# Patient Record
Sex: MALE | Race: WHITE | NOT HISPANIC OR LATINO | Employment: FULL TIME | URBAN - METROPOLITAN AREA
[De-identification: names, ages, dates, MRNs, and addresses within clinical notes are randomized per-mention and may not be internally consistent; named-entity substitution may affect disease eponyms.]

---

## 2022-07-20 ENCOUNTER — TELEPHONE (OUTPATIENT)
Dept: ORTHOPEDICS | Facility: CLINIC | Age: 42
End: 2022-07-20

## 2022-07-20 NOTE — TELEPHONE ENCOUNTER
ANDRZEJ. Patient is coming in for a 2nd opinion and is post-surgical ACL of the left knee. LVM stating that Dr. Allen is happy to see him for this appointment but wanted to let him know that if he is looking for a surgical opinion, Dr. Allen is not a surgeon. Melly Prater, ATC

## 2022-07-20 NOTE — PROGRESS NOTES
"Jose Ford  :  1980  DOS: 2022  MRN: [id number]    Sports Medicine Clinic Visit    PCP: No primary care provider on file.    Jose Ford is a 42 year old male who is seen as a self referral presenting with left knee pain.    Injury: Surgery at age 18. Notes 3 weeks ago, anteroinferior knee and medial knee pain. Popping incident while scratching the back of his left knee with his right toe. Able to put partial weight on his knee with the assistance of crutches.  Additional Features:  Positive: popping, grinding, swelling and fells like it is popping \"in and out of socket\".  Symptoms are better with Nothing.  Symptoms are worse with: ambulating and weightbearing.  Other evaluation and/or treatments so far consists of: aleve, SalonPas, water pill, IcyHot, bracing and crutches.  Recent imaging completed: X-rays completed 2022.  Prior History of related problems: Flare-ups since surgery at age 18 but nothing this prolonged or painful.    Social History: Works as a cook/    Review of Systems  Musculoskeletal: as above  Remainder of review of systems is negative including constitutional, CV, pulmonary, GI, Skin and Neurologic except as noted in HPI or medical history.    No past medical history on file.  No past surgical history on file.  No family history on file.    Objective  BP (!) 190/118   Wt 76.2 kg (168 lb)       General: healthy, alert and in no distress      HEENT: no scleral icterus or conjunctival erythema     Skin: no suspicious lesions or rash. No jaundice.     CV: regular rhythm by palpation, 2+ distal pulses, no pedal edema      Resp: normal respiratory effort without conversational dyspnea     Psych: normal mood and affect      Gait: antalgic, appropriate coordination and balance, using crutches     Neuro: normal light touch sensory exam of the extremities. Motor strength as noted below     Left Knee exam    ROM:        Flexion 90 degrees       Extension -6 degrees    "    Range of motion limited by pain, effusion    Inspection:       no visible ecchymosis        effusion noted large left    Skin:       no visible deformities       well perfused       capillary refill brisk       Mild warmth about knee, no redness, induration    Patellar Motion:        Lateral tilt noted in patella       Crepitus noted in the patellofemoral joint    Tender:        medial patellar border       lateral patellar border       medial joint line       lateral joint line    Non Tender:         remainder of knee area    Special Tests:        neg (-) varus at 0 deg and 30 deg       neg (-) valgus at 0 deg and 30 deg       Positive for pain with forced extension    Evaluation of ipsilateral kinetic chain       decreased strength with resisted abduction of the left hip       decreased strength with resisted extension of the left hip       Quadriceps atrophy on the left      Radiology  Large Joint Injection/Arthocentesis: L knee joint    Date/Time: 7/27/2022 11:25 AM  Performed by: Brendan Allen DO  Authorized by: Brendan Allen DO     Indications:  Pain  Needle Size:  21 G  Guidance: ultrasound    Approach:  Superolateral  Location:  Knee      Medications:  3 mL ropivacaine 5 MG/ML; 40 mg triamcinolone 40 MG/ML  Aspirate amount (mL):  30  Aspirate:  Clear and yellow  Outcome:  Tolerated well, no immediate complications  Procedure discussed: discussed risks, benefits, and alternatives    Consent Given by:  Patient  Timeout: timeout called immediately prior to procedure    Prep: patient was prepped and draped in usual sterile fashion     Ultrasound images of procedure were permanently stored.           Assessment:  1. Chronic pain of left knee    2. Knee effusion, left    3. Osteoarthritis of left knee, unspecified osteoarthritis type    4. Atrophy of quadriceps femoris muscle    5. History of reconstruction of anterior cruciate ligament tear        Plan:  Discussed the assessment with the  patient.  Follow up: prn based on clinical progress  Acute flare of chronic knee pain  Prior imaging reviewed including recent x-rays from [facility] demonstrating significant osteoarthritis in the patellofemoral and lateral compartments  XR images independently visualized and reviewed with patient today in clinic  Large knee effusion today from acute flare  Ultrasound-guided corticosteroid injection and aspiration performed today, reviewed relative risks and potential benefits  Preauthorization submitted today for consideration of viscosupplementation option in the future, which is safer and should be tried if he has recurrent pain especially given his age  Reviewed activity modification and progressive increase in activity as tolerated and guided by pain  Physical therapy option offered and potential benefits reviewed  Reviewed options for potential steroid vs viscosupplementation injections and the possibility for future orthopedic referral prn  Reviewed safe and appropriate OTC medication choices, try tylenol first  Up to 3000mg daily of tylenol is generally safe, NSAID dosing and duration limitations reviewed  Discussed nature of degenerative arthrosis of the knee.   Discussed symptom treatment with ice or heat, topical treatments, and rest if needed.   Expectations and goals of CSI reviewed  Often 2-3 days for steroid effect, and can take up to two weeks for maximum effect  We discussed modified progressive pain-free activity as tolerated  Do not overuse in first two weeks if feeling better due to concern for vulnerability while steroid is working  Supportive care reviewed  All questions were answered today  Contact us with additional questions or concerns  Signs and sx of concern reviewed      Brendan Allen DO, MARY LOU  Sports Medicine Physician  [facility]              Disclaimer: This note consists of symbols derived from keyboarding, dictation and/or voice recognition software.  As a result, there may be errors in the script that have gone undetected. Please consider this when interpreting information found in this chart.

## 2022-07-27 ENCOUNTER — OFFICE VISIT (OUTPATIENT)
Dept: ORTHOPEDICS | Facility: CLINIC | Age: 42
End: 2022-07-27
Payer: COMMERCIAL

## 2022-07-27 VITALS — WEIGHT: 168 LBS | DIASTOLIC BLOOD PRESSURE: 118 MMHG | SYSTOLIC BLOOD PRESSURE: 190 MMHG

## 2022-07-27 DIAGNOSIS — M17.12 OSTEOARTHRITIS OF LEFT KNEE, UNSPECIFIED OSTEOARTHRITIS TYPE: ICD-10-CM

## 2022-07-27 DIAGNOSIS — M62.559 ATROPHY OF QUADRICEPS FEMORIS MUSCLE: ICD-10-CM

## 2022-07-27 DIAGNOSIS — M25.462 KNEE EFFUSION, LEFT: ICD-10-CM

## 2022-07-27 DIAGNOSIS — M25.562 CHRONIC PAIN OF LEFT KNEE: Primary | ICD-10-CM

## 2022-07-27 DIAGNOSIS — G89.29 CHRONIC PAIN OF LEFT KNEE: Primary | ICD-10-CM

## 2022-07-27 DIAGNOSIS — Z98.890 HISTORY OF RECONSTRUCTION OF ANTERIOR CRUCIATE LIGAMENT TEAR: ICD-10-CM

## 2022-07-27 PROCEDURE — 99203 OFFICE O/P NEW LOW 30 MIN: CPT | Mod: 25 | Performed by: FAMILY MEDICINE

## 2022-07-27 PROCEDURE — 20611 DRAIN/INJ JOINT/BURSA W/US: CPT | Mod: LT | Performed by: FAMILY MEDICINE

## 2022-07-27 RX ORDER — ROPIVACAINE HYDROCHLORIDE 5 MG/ML
3 INJECTION, SOLUTION EPIDURAL; INFILTRATION; PERINEURAL
Status: SHIPPED | OUTPATIENT
Start: 2022-07-27

## 2022-07-27 RX ORDER — TRIAMCINOLONE ACETONIDE 40 MG/ML
40 INJECTION, SUSPENSION INTRA-ARTICULAR; INTRAMUSCULAR
Status: SHIPPED | OUTPATIENT
Start: 2022-07-27

## 2022-07-27 RX ADMIN — TRIAMCINOLONE ACETONIDE 40 MG: 40 INJECTION, SUSPENSION INTRA-ARTICULAR; INTRAMUSCULAR at 11:25

## 2022-07-27 RX ADMIN — ROPIVACAINE HYDROCHLORIDE 3 ML: 5 INJECTION, SOLUTION EPIDURAL; INFILTRATION; PERINEURAL at 11:25

## 2022-07-27 ASSESSMENT — PAIN SCALES - GENERAL: PAINLEVEL: EXTREME PAIN (8)

## 2022-07-27 NOTE — LETTER
"    2022         RE: Jose Ford  [address] [address] [address]        Dear Colleague,    Thank you for referring your patient, Jose Ford, to the [facility] [place]. Please see a copy of my visit note below.    Jose Ford  :  1980  DOS: 2022  MRN: [id number]    Sports Medicine Clinic Visit    PCP: No primary care provider on file.    Jose Ford is a 42 year old male who is seen as a self referral presenting with left knee pain.    Injury: Surgery at age 18. Notes 3 weeks ago, anteroinferior knee and medial knee pain. Popping incident while scratching the back of his left knee with his right toe. Able to put partial weight on his knee with the assistance of crutches.  Additional Features:  Positive: popping, grinding, swelling and fells like it is popping \"in and out of socket\".  Symptoms are better with Nothing.  Symptoms are worse with: ambulating and weightbearing.  Other evaluation and/or treatments so far consists of: aleve, SalonPas, water pill, IcyHot, bracing and crutches.  Recent imaging completed: X-rays completed 2022.  Prior History of related problems: Flare-ups since surgery at age 18 but nothing this prolonged or painful.    Social History: Works as a cook/    Review of Systems  Musculoskeletal: as above  Remainder of review of systems is negative including constitutional, CV, pulmonary, GI, Skin and Neurologic except as noted in HPI or medical history.    No past medical history on file.  No past surgical history on file.  No family history on file.    Objective  BP (!) 190/118   Wt 76.2 kg (168 lb)       General: healthy, alert and in no distress      HEENT: no scleral icterus or conjunctival erythema     Skin: no suspicious lesions or rash. No jaundice.     CV: regular rhythm by palpation, 2+ distal pulses, no pedal edema      Resp: normal respiratory effort without conversational dyspnea     Psych: normal " mood and affect      Gait: antalgic, appropriate coordination and balance, using crutches     Neuro: normal light touch sensory exam of the extremities. Motor strength as noted below     Left Knee exam    ROM:        Flexion 90 degrees       Extension -6 degrees       Range of motion limited by pain, effusion    Inspection:       no visible ecchymosis        effusion noted large left    Skin:       no visible deformities       well perfused       capillary refill brisk       Mild warmth about knee, no redness, induration    Patellar Motion:        Lateral tilt noted in patella       Crepitus noted in the patellofemoral joint    Tender:        medial patellar border       lateral patellar border       medial joint line       lateral joint line    Non Tender:         remainder of knee area    Special Tests:        neg (-) varus at 0 deg and 30 deg       neg (-) valgus at 0 deg and 30 deg       Positive for pain with forced extension    Evaluation of ipsilateral kinetic chain       decreased strength with resisted abduction of the left hip       decreased strength with resisted extension of the left hip       Quadriceps atrophy on the left      Radiology  Large Joint Injection/Arthocentesis: L knee joint    Date/Time: 7/27/2022 11:25 AM  Performed by: Brendan Allen DO  Authorized by: Brendan Allen DO     Indications:  Pain  Needle Size:  21 G  Guidance: ultrasound    Approach:  Superolateral  Location:  Knee      Medications:  3 mL ropivacaine 5 MG/ML; 40 mg triamcinolone 40 MG/ML  Aspirate amount (mL):  30  Aspirate:  Clear and yellow  Outcome:  Tolerated well, no immediate complications  Procedure discussed: discussed risks, benefits, and alternatives    Consent Given by:  Patient  Timeout: timeout called immediately prior to procedure    Prep: patient was prepped and draped in usual sterile fashion     Ultrasound images of procedure were permanently stored.           Assessment:  1. Chronic  pain of left knee    2. Knee effusion, left    3. Osteoarthritis of left knee, unspecified osteoarthritis type    4. Atrophy of quadriceps femoris muscle    5. History of reconstruction of anterior cruciate ligament tear        Plan:  Discussed the assessment with the patient.  Follow up: prn based on clinical progress  Acute flare of chronic knee pain  Prior imaging reviewed including recent x-rays from [facility] demonstrating significant osteoarthritis in the patellofemoral and lateral compartments  XR images independently visualized and reviewed with patient today in clinic  Large knee effusion today from acute flare  Ultrasound-guided corticosteroid injection and aspiration performed today, reviewed relative risks and potential benefits  Preauthorization submitted today for consideration of viscosupplementation option in the future, which is safer and should be tried if he has recurrent pain especially given his age  Reviewed activity modification and progressive increase in activity as tolerated and guided by pain  Physical therapy option offered and potential benefits reviewed  Reviewed options for potential steroid vs viscosupplementation injections and the possibility for future orthopedic referral prn  Reviewed safe and appropriate OTC medication choices, try tylenol first  Up to 3000mg daily of tylenol is generally safe, NSAID dosing and duration limitations reviewed  Discussed nature of degenerative arthrosis of the knee.   Discussed symptom treatment with ice or heat, topical treatments, and rest if needed.   Expectations and goals of CSI reviewed  Often 2-3 days for steroid effect, and can take up to two weeks for maximum effect  We discussed modified progressive pain-free activity as tolerated  Do not overuse in first two weeks if feeling better due to concern for vulnerability while steroid is working  Supportive care reviewed  All questions were answered today  Contact us with additional questions or  concerns  Signs and sx of concern reviewed      Brendan Allen DO, CAQ  Sports Medicine Physician  [facility]              Disclaimer: This note consists of symbols derived from keyboarding, dictation and/or voice recognition software. As a result, there may be errors in the script that have gone undetected. Please consider this when interpreting information found in this chart.        Again, thank you for allowing me to participate in the care of your patient.        Sincerely,        Brendan Allen DO

## 2022-07-27 NOTE — LETTER
July 27, 2022      Jose was seen in my office today and is under my care.  He had a procedure today and should limit painful weightbearing activity at work as needed.  Seated work will be less painful when available.  He can otherwise work as tolerated.  If he has severe pain over the remainder of the week and needs to miss work, any absence for the remainder of this week should be considered medically excused.      Brendan Mcdowell DO, CAQ  Sports Medicine Physician  [facility]

## 2022-08-04 ENCOUNTER — TELEPHONE (OUTPATIENT)
Dept: ORTHOPEDICS | Facility: CLINIC | Age: 42
End: 2022-08-04

## 2022-08-04 NOTE — TELEPHONE ENCOUNTER
----- Message from Navya Gong sent at 8/4/2022  7:34 AM CDT -----  Regarding: SYNVISC DENIAL  Good morning,    I just received a denial from [facility] on our synvisc one request. Their reasoning is that they want documentation of all of the following:    -pain has persisted despite a 4 week course of physical therapy in the last 6 months  -pain has persisted despite medical management with acetaminophen, nsaids, or other analgesic medications for at least 3 months in the last 6 months.  -ultrasound guidance is not being used  -and not receiving concomitant injections of a corticosteroid.    Please let me know if you would like to appeal.    Thank you,  Navya

## 2022-08-04 NOTE — TELEPHONE ENCOUNTER
LVM to discuss questions insurance needs answered to determine visco injection coverage. Melly Prater, ATC

## 2022-08-04 NOTE — TELEPHONE ENCOUNTER
Spoke with patient. 4 weeks of physical therapy has not been completed. He regularly takes Aleve and Shannan back and body for pain relief along with ibuprofen and Tylenol, on occasion. Has been occurring for over 3 months. Melly Prater ATC

## 2022-08-05 NOTE — TELEPHONE ENCOUNTER
He is doing really well right now after his corticosteroid injection. I have advised him on physical therapy need for coverage to be obtained and he is unwilling to complete at this time. No appeal needed at this time. Thank you. Melly Prater ATC

## 2022-08-08 NOTE — TELEPHONE ENCOUNTER
Patient's mother reached out via her [employer]. Informed her we are unable to discuss his care via her [employer]. Sent activation code to Jose to activate his own [employer] account. Melly Prater, ATC

## 2022-08-30 ENCOUNTER — OFFICE VISIT (OUTPATIENT)
Dept: ORTHOPEDICS | Facility: CLINIC | Age: 42
End: 2022-08-30
Payer: COMMERCIAL

## 2022-08-30 VITALS
WEIGHT: 168 LBS | DIASTOLIC BLOOD PRESSURE: 107 MMHG | BODY MASS INDEX: 26.37 KG/M2 | HEIGHT: 67 IN | SYSTOLIC BLOOD PRESSURE: 170 MMHG

## 2022-08-30 DIAGNOSIS — M17.12 OSTEOARTHRITIS OF LEFT KNEE, UNSPECIFIED OSTEOARTHRITIS TYPE: ICD-10-CM

## 2022-08-30 DIAGNOSIS — M62.559 ATROPHY OF QUADRICEPS FEMORIS MUSCLE: ICD-10-CM

## 2022-08-30 DIAGNOSIS — G89.29 CHRONIC PAIN OF LEFT KNEE: Primary | ICD-10-CM

## 2022-08-30 DIAGNOSIS — M25.462 KNEE EFFUSION, LEFT: ICD-10-CM

## 2022-08-30 DIAGNOSIS — M25.562 CHRONIC PAIN OF LEFT KNEE: Primary | ICD-10-CM

## 2022-08-30 DIAGNOSIS — Z98.890 HISTORY OF RECONSTRUCTION OF ANTERIOR CRUCIATE LIGAMENT TEAR: ICD-10-CM

## 2022-08-30 DIAGNOSIS — M25.362 KNEE INSTABILITY, LEFT: ICD-10-CM

## 2022-08-30 PROCEDURE — 99207 PR NO CHARGE LOS: CPT | Performed by: FAMILY MEDICINE

## 2022-08-30 NOTE — LETTER
"    2022         RE: Jose Ford  [address] [address] [address]        Dear Colleague,    Thank you for referring your patient, Jose Ford, to the [facility] [place]. Please see a copy of my visit note below.    Jose Ford  :  1980  DOS: 22  MRN: [id number]    Sports Medicine Clinic Visit    PCP: No primary care provider on file.    Jose Ford is a 42 year old male who is seen as a self referral presenting with left knee pain.    Injury: Surgery at age 18. Notes 3 weeks ago, anteroinferior knee and medial knee pain. Popping incident while scratching the back of his left knee with his right toe. Able to put partial weight on his knee with the assistance of crutches.  Additional Features:  Positive: popping, grinding, swelling and fells like it is popping \"in and out of socket\".  Symptoms are better with Nothing.  Symptoms are worse with: ambulating and weightbearing.  Other evaluation and/or treatments so far consists of: aleve, SalonPas, water pill, IcyHot, bracing and crutches.  Recent imaging completed: X-rays completed 2022.  Prior History of related problems: Flare-ups since surgery at age 18 but nothing this prolonged or painful.    Social History: Works as a cook/     Interim History - 2022  Since last visit on 22 patient has moderate left knee pain with continued periodic instability.  Left knee steroid injection completed on 22 provided pain relief, but notes continued weakness.  Would like to discuss SynviscOne denial.  No new injury in the interim.      Review of Systems  Musculoskeletal: as above  Remainder of review of systems is negative including constitutional, CV, pulmonary, GI, Skin and Neurologic except as noted in HPI or medical history.    No past medical history on file.  No past surgical history on file.  No family history on file.    Objective  BP (!) 170/107   Ht 1.702 m (5' 7\")  "  Wt 76.2 kg (168 lb)   BMI 26.31 kg/m        General: healthy, alert and in no distress      HEENT: no scleral icterus or conjunctival erythema     Skin: no suspicious lesions or rash. No jaundice.     CV: regular rhythm by palpation, 2+ distal pulses, no pedal edema      Resp: normal respiratory effort without conversational dyspnea     Psych: normal mood and affect      Gait: antalgic, appropriate coordination and balance, using crutches     Neuro: normal light touch sensory exam of the extremities. Motor strength as noted below     Left Knee exam    ROM:        Flexion 130 degrees       Extension -4 degrees       Range of motion limited by pain    Inspection:        no visible ecchymosis        effusion noted small left    Skin:       no visible deformities       well perfused       capillary refill brisk       Less warmth about knee, no redness or induration    Patellar Motion:        Lateral tilt noted in patella       Crepitus noted in the patellofemoral joint    Tender:        lateral patellar border       medial joint line most focal       lateral joint line    Non Tender:         remainder of knee area    Special Tests:        neg (-) varus at 0 deg and 30 deg       neg (-) valgus at 0 deg and 30 deg       Positive for pain with forced extension    Evaluation of ipsilateral kinetic chain       decreased strength with resisted abduction of the left hip       decreased strength with resisted extension of the left hip       Quadriceps atrophy on the left      Radiology  XR report from [facility] 7/12/2022:  Left knee x-rays (3V: WB AP, sunrise and lateral) were ordered and interpreted in the office today.  Findings: No evidence of fracture or dislocation. Valgus knee joint alignment with evidence of moderate degenerative changes. Moderate evidence of narrowing and osteophytes, laterally.  Visualization of two metal interference screws s/p ACL reconstruction. steep tunnel positioning with remote tunnel widening.  No hardware complications. Moderate lateral joint degenerative joint changes. No loose bodies. No evidence of fracture or dislocation.     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 Chronic pain of left knee    2. Knee effusion, left    3. Osteoarthritis of left knee, unspecified osteoarthritis type    4. Atrophy of quadriceps femoris muscle    5. History of reconstruction of anterior cruciate ligament tear    6. Knee instability, left        Plan:  Discussed the assessment with the patient.  Follow up: prn based on clinical progress  Plan for f/u after completing 4 weeks of PT, which will be needed in order to get approval for Viscosupplementation trial  He had an expectation of receiving this injection today, no charge for visit today  PT order placed as previously offered  Prior imaging reviewed including recent x-rays from [facility] demonstrating significant osteoarthritis in the patellofemoral and lateral compartments  XR images independently visualized and reviewed with patient today in clinic  Large knee effusion today from acute flare improved s/p CSI and aspiration  Reviewed activity modification and progressive increase in activity as tolerated and guided by pain  Physical therapy option offered and potential benefits reviewed  Reviewed options for potential steroid vs viscosupplementation injections and the possibility for future orthopedic referral prn  Reviewed safe and appropriate OTC medication choices, try tylenol first  Supportive care reviewed  All questions were answered today  Contact us with additional questions or concerns  Signs and sx of concern reviewed      Brendan Allen DO, MARY LOU  Sports Medicine Physician  [facility]              Disclaimer: This note consists of symbols derived from keyboarding, dictation and/or voice recognition software. As a result, there may be  errors in the script that have gone undetected. Please consider this when interpreting information found in this chart.        Again, thank you for allowing me to participate in the care of your patient.        Sincerely,        Brendan Allen, DO

## 2022-08-30 NOTE — PROGRESS NOTES
"Jose Ford  :  1980  DOS: 22  MRN: [id number]    Sports Medicine Clinic Visit    PCP: No primary care provider on file.    Jose Ford is a 42 year old male who is seen as a self referral presenting with left knee pain.    Injury: Surgery at age 18. Notes 3 weeks ago, anteroinferior knee and medial knee pain. Popping incident while scratching the back of his left knee with his right toe. Able to put partial weight on his knee with the assistance of crutches.  Additional Features:  Positive: popping, grinding, swelling and fells like it is popping \"in and out of socket\".  Symptoms are better with Nothing.  Symptoms are worse with: ambulating and weightbearing.  Other evaluation and/or treatments so far consists of: aleve, SalonPas, water pill, IcyHot, bracing and crutches.  Recent imaging completed: X-rays completed 2022.  Prior History of related problems: Flare-ups since surgery at age 18 but nothing this prolonged or painful.    Social History: Works as a cook/     Interim History - 2022  Since last visit on 22 patient has moderate left knee pain with continued periodic instability.  Left knee steroid injection completed on 22 provided pain relief, but notes continued weakness.  Would like to discuss SynviscOne denial.  No new injury in the interim.      Review of Systems  Musculoskeletal: as above  Remainder of review of systems is negative including constitutional, CV, pulmonary, GI, Skin and Neurologic except as noted in HPI or medical history.    No past medical history on file.  No past surgical history on file.  No family history on file.    Objective  BP (!) 170/107   Ht 1.702 m (5' 7\")   Wt 76.2 kg (168 lb)   BMI 26.31 kg/m        General: healthy, alert and in no distress      HEENT: no scleral icterus or conjunctival erythema     Skin: no suspicious lesions or rash. No jaundice.     CV: regular rhythm by palpation, 2+ distal pulses, no pedal " edema      Resp: normal respiratory effort without conversational dyspnea     Psych: normal mood and affect      Gait: antalgic, appropriate coordination and balance, using crutches     Neuro: normal light touch sensory exam of the extremities. Motor strength as noted below     Left Knee exam    ROM:        Flexion 130 degrees       Extension -4 degrees       Range of motion limited by pain    Inspection:        no visible ecchymosis        effusion noted small left    Skin:       no visible deformities       well perfused       capillary refill brisk       Less warmth about knee, no redness or induration    Patellar Motion:        Lateral tilt noted in patella       Crepitus noted in the patellofemoral joint    Tender:        lateral patellar border       medial joint line most focal       lateral joint line    Non Tender:         remainder of knee area    Special Tests:        neg (-) varus at 0 deg and 30 deg       neg (-) valgus at 0 deg and 30 deg       Positive for pain with forced extension    Evaluation of ipsilateral kinetic chain       decreased strength with resisted abduction of the left hip       decreased strength with resisted extension of the left hip       Quadriceps atrophy on the left      Radiology  XR report from [facility] 7/12/2022:  Left knee x-rays (3V: WB AP, sunrise and lateral) were ordered and interpreted in the office today.  Findings: No evidence of fracture or dislocation. Valgus knee joint alignment with evidence of moderate degenerative changes. Moderate evidence of narrowing and osteophytes, laterally.  Visualization of two metal interference screws s/p ACL reconstruction. steep tunnel positioning with remote tunnel widening. No hardware complications. Moderate lateral joint degenerative joint changes. No loose bodies. No evidence of fracture or dislocation.     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 Chronic pain of left knee    2. Knee effusion, left    3. Osteoarthritis of left knee, unspecified osteoarthritis type    4. Atrophy of quadriceps femoris muscle    5. History of reconstruction of anterior cruciate ligament tear    6. Knee instability, left        Plan:  Discussed the assessment with the patient.  Follow up: prn based on clinical progress  Plan for f/u after completing 4 weeks of PT, which will be needed in order to get approval for Viscosupplementation trial  He had an expectation of receiving this injection today, no charge for visit today  PT order placed as previously offered  Prior imaging reviewed including recent x-rays from [facility] demonstrating significant osteoarthritis in the patellofemoral and lateral compartments  XR images independently visualized and reviewed with patient today in clinic  Large knee effusion today from acute flare improved s/p CSI and aspiration  Reviewed activity modification and progressive increase in activity as tolerated and guided by pain  Physical therapy option offered and potential benefits reviewed  Reviewed options for potential steroid vs viscosupplementation injections and the possibility for future orthopedic referral prn  Reviewed safe and appropriate OTC medication choices, try tylenol first  Supportive care reviewed  All questions were answered today  Contact us with additional questions or concerns  Signs and sx of concern reviewed      Brendan Allen DO, MARY LOU  Sports Medicine Physician  [facility]              Disclaimer: This note consists of symbols derived from keyboarding, dictation and/or voice recognition software. As a result, there may be errors in the script that have gone undetected. Please consider this when interpreting information found in this chart.

## 2022-09-14 ENCOUNTER — THERAPY VISIT (OUTPATIENT)
Dept: PHYSICAL THERAPY | Facility: CLINIC | Age: 42
End: 2022-09-14
Attending: FAMILY MEDICINE
Payer: COMMERCIAL

## 2022-09-14 DIAGNOSIS — M62.559 ATROPHY OF QUADRICEPS FEMORIS MUSCLE: ICD-10-CM

## 2022-09-14 DIAGNOSIS — M25.562 CHRONIC PAIN OF LEFT KNEE: ICD-10-CM

## 2022-09-14 DIAGNOSIS — G89.29 CHRONIC PAIN OF LEFT KNEE: ICD-10-CM

## 2022-09-14 DIAGNOSIS — M17.12 OSTEOARTHRITIS OF LEFT KNEE, UNSPECIFIED OSTEOARTHRITIS TYPE: ICD-10-CM

## 2022-09-14 DIAGNOSIS — M25.362 KNEE INSTABILITY, LEFT: ICD-10-CM

## 2022-09-14 DIAGNOSIS — Z98.890 HISTORY OF RECONSTRUCTION OF ANTERIOR CRUCIATE LIGAMENT TEAR: ICD-10-CM

## 2022-09-14 DIAGNOSIS — M25.462 KNEE EFFUSION, LEFT: ICD-10-CM

## 2022-09-14 PROCEDURE — 97161 PT EVAL LOW COMPLEX 20 MIN: CPT | Mod: GP | Performed by: PHYSICAL THERAPIST

## 2022-09-14 PROCEDURE — 97110 THERAPEUTIC EXERCISES: CPT | Mod: GP | Performed by: PHYSICAL THERAPIST

## 2022-09-14 ASSESSMENT — ACTIVITIES OF DAILY LIVING (ADL)
HOW_WOULD_YOU_RATE_THE_CURRENT_FUNCTION_OF_YOUR_KNEE_DURING_YOUR_USUAL_DAILY_ACTIVITIES_ON_A_SCALE_FROM_0_TO_100_WITH_100_BEING_YOUR_LEVEL_OF_KNEE_FUNCTION_PRIOR_TO_YOUR_INJURY_AND_0_BEING_THE_INABILITY_TO_PERFORM_ANY_OF_YOUR_USUAL_DAILY_ACTIVITIES?: 60
SWELLING: THE SYMPTOM AFFECTS MY ACTIVITY MODERATELY
STIFFNESS: THE SYMPTOM AFFECTS MY ACTIVITY SLIGHTLY
GO DOWN STAIRS: ACTIVITY IS MINIMALLY DIFFICULT
KNEEL ON THE FRONT OF YOUR KNEE: I AM UNABLE TO DO THE ACTIVITY
RAW_SCORE: 38
SIT WITH YOUR KNEE BENT: ACTIVITY IS NOT DIFFICULT
PAIN: THE SYMPTOM AFFECTS MY ACTIVITY SLIGHTLY
WEAKNESS: THE SYMPTOM AFFECTS MY ACTIVITY MODERATELY
GIVING WAY, BUCKLING OR SHIFTING OF KNEE: THE SYMPTOM AFFECTS MY ACTIVITY SEVERELY
AS_A_RESULT_OF_YOUR_KNEE_INJURY,_HOW_WOULD_YOU_RATE_YOUR_CURRENT_LEVEL_OF_DAILY_ACTIVITY?: ABNORMAL
KNEE_ACTIVITY_OF_DAILY_LIVING_SUM: 38
RISE FROM A CHAIR: ACTIVITY IS NOT DIFFICULT
SQUAT: I AM UNABLE TO DO THE ACTIVITY
HOW_WOULD_YOU_RATE_THE_OVERALL_FUNCTION_OF_YOUR_KNEE_DURING_YOUR_USUAL_DAILY_ACTIVITIES?: NEARLY NORMAL
WALK: ACTIVITY IS MINIMALLY DIFFICULT
GO UP STAIRS: ACTIVITY IS MINIMALLY DIFFICULT
KNEE_ACTIVITY_OF_DAILY_LIVING_SCORE: 54.29
STAND: ACTIVITY IS SOMEWHAT DIFFICULT
LIMPING: THE SYMPTOM AFFECTS MY ACTIVITY MODERATELY

## 2022-09-14 NOTE — PROGRESS NOTES
Physical Therapy Initial Evaluation  Subjective:  The history is provided by the patient.   Therapist Generated HPI Evaluation  Problem details: Tore ACL many years ago.  Surgery to repair that.  Several weeks ago was standing and twisting motion increased pain in L knee.  Needs to do PT to get injections.  MD referral 8/4/2022..         Type of problem:  Left knee.    This is a chronic condition.  Condition occurred with:  A twist.  Where condition occurred: in the community.  Patient reports pain:  Anterior and posterior.  Pain is described as aching and is constant.  Pain is the same all the time.  Since onset symptoms are gradually worsening.  Associated symptoms:  Buckling/giving out, catching, locking and edema. Symptoms are exacerbated by bending/squatting, ascending stairs, descending stairs, standing and kneeling  and relieved by ice (cortisone used to help).  Special tests included:  X-ray.  Past treatment: previous cortisone injections helped, but last one did not.   Restrictions due to condition include:  Working in normal job without restrictions.      Patient Health History  Jose Ford being seen for L knee.     Date of Onset: original injury 1997.   Problem occurred: sports   Pain is reported as 5/10 on pain scale.  General health as reported by patient is excellent.  Pertinent medical history includes: none.   Red flags:  None as reported by patient.  Medical allergies: none.   Surgeries include:  Orthopedic surgery. Other surgery history details: knee ACL.    Current medications:  Pain medication.    Current occupation is cook/ .   Primary job tasks include:  Lifting/carrying, prolonged standing, pushing/pulling and repetitive tasks.                                    Objective:  Standing Alignment:              Knee deviations alignment: slight flexion in L knee with standing.      Gait:      Deviations:  Knee:  Knee extension decr L                                                       Knee Evaluation:  ROM:    AROM    Hyperextension: Left:     Right: -2  Extension: Left: 7    Right:   Flexion: Left: 120    Right: 143        Strength:     Extension:  Left: 4+/5   Pain:      Right: 5/5   Pain:  Flexion:  Left: 5-/5   Pain:      Right: 5/5   Pain:    Quad Set Left:  Good    Pain: +   Quad Set Right: Good    Pain:  Ligament Testing:  Normal                Special Tests:   Left knee positive for the following special tests:  Patellar Compression  Left knee negative for the following special tests:  Meninscal    Palpation:    Left knee tenderness present at:  Patellar Tendon and Popliteal  Left knee tenderness not present at:  Medial Joint Line; Lateral Joint Line and IT Band    Edema:  Edema of the knee: no swelling currently.      Functional Testing:          Quad:    Single Leg Squat:  Left:       Right:        Bilateral Leg Squat:  Leand to R  Mild loss of control              General     ROS    Assessment/Plan:    Patient is a 42 year old male with left side knee complaints.    Patient has the following significant findings with corresponding treatment plan.                Diagnosis 1:  L knee pain   Pain -  self management, education, directional preference exercise and home program  Decreased ROM/flexibility - manual therapy and therapeutic exercise  Decreased strength - therapeutic exercise and therapeutic activities  Impaired muscle performance - neuro re-education  Decreased function - therapeutic activities    Therapy Evaluation Codes:   1) History comprised of:   Personal factors that impact the plan of care:      None.    Comorbidity factors that impact the plan of care are:      None.     Medications impacting care: None.  2) Examination of Body Systems comprised of:   Body structures and functions that impact the plan of care:      Knee.   Activity limitations that impact the plan of care are:      Squatting/kneeling and Stairs.  3) Clinical presentation characteristics  are:   Stable/Uncomplicated.  4) Decision-Making    Low complexity using standardized patient assessment instrument and/or measureable assessment of functional outcome.  Cumulative Therapy Evaluation is: Low complexity.    Previous and current functional limitations:  (See Goal Flow Sheet for this information)    Short term and Long term goals: (See Goal Flow Sheet for this information)     Communication ability:  Patient appears to be able to clearly communicate and understand verbal and written communication and follow directions correctly.  Treatment Explanation - The following has been discussed with the patient:   RX ordered/plan of care  Anticipated outcomes  Possible risks and side effects  This patient would benefit from PT intervention to resume normal activities.   Rehab potential is good.    Frequency:  1 X week, once daily  Duration:  for 4 weeks  Discharge Plan:  Achieve all LTG.  Independent in home treatment program.  Reach maximal therapeutic benefit.    Please refer to the daily flowsheet for treatment today, total treatment time and time spent performing 1:1 timed codes.

## 2022-09-14 NOTE — PROGRESS NOTES
[facility]    OUTPATIENT Physical Therapy ORTHOPEDIC EVALUATION  PLAN OF TREATMENT FOR OUTPATIENT REHABILITATION  (COMPLETE FOR INITIAL CLAIMS ONLY)  Patient's Last Name, First Name, M.I.  YOB: 1980  Jose Ford    Provider s Name:  [facility]   Medical Record No.  [id number]   Start of Care Date:  09/14/22   Onset Date:   08/04/22   Type:     _X__PT   ___OT Medical Diagnosis:    Encounter Diagnoses   Name Primary?    Chronic pain of left knee     Knee effusion, left     Osteoarthritis of left knee, unspecified osteoarthritis type     Atrophy of quadriceps femoris muscle     History of reconstruction of anterior cruciate ligament tear     Knee instability, left         Treatment Diagnosis:  L knee pain        Goals:     09/14/22 0500   Body Part   Goals listed below are for L knee   Goal #1   Goal #1 squatting/kneeling   Previous Functional Level No restrictions   Current Functional Level Can squat with pain of level    Performance Level 5/10   STG Target Performance Reduce pain with squatting to    Performance Level 3/10   Rationale for job requirements in their work place;for proper body mechanics while performing housework;for proper body mechanics while performing yardwork and home maintenance   Due date 09/28/22   LTG Target Performance Reduce pain with squatting to    Performance Level 1/10   Rationale for job requirements in their work place;for proper body mechanics while performing housework;for proper body mechanics while performing yardwork and home maintenance   Due date 10/12/22       Therapy Frequency:  1x/ week  Predicted Duration of Therapy Intervention:  4 week    Jose G Norwood, PT                 I CERTIFY THE NEED FOR THESE SERVICES FURNISHED UNDER        THIS PLAN OF TREATMENT AND WHILE UNDER MY CARE     (Physician attestation of this  document indicates review and certification of the therapy plan).                     Certification Date From:  09/14/22   Certification Date To:  10/13/22    Referring Provider:  Brendan Allen    Initial Assessment        See Epic Evaluation SOC Date: 09/14/22

## 2022-09-21 ENCOUNTER — THERAPY VISIT (OUTPATIENT)
Dept: PHYSICAL THERAPY | Facility: CLINIC | Age: 42
End: 2022-09-21
Attending: FAMILY MEDICINE
Payer: COMMERCIAL

## 2022-09-21 DIAGNOSIS — M25.562 CHRONIC PAIN OF LEFT KNEE: Primary | ICD-10-CM

## 2022-09-21 DIAGNOSIS — G89.29 CHRONIC PAIN OF LEFT KNEE: Primary | ICD-10-CM

## 2022-09-21 PROCEDURE — 97110 THERAPEUTIC EXERCISES: CPT | Mod: GP | Performed by: PHYSICAL THERAPIST

## 2022-09-28 ENCOUNTER — THERAPY VISIT (OUTPATIENT)
Dept: PHYSICAL THERAPY | Facility: CLINIC | Age: 42
End: 2022-09-28
Attending: FAMILY MEDICINE
Payer: COMMERCIAL

## 2022-09-28 DIAGNOSIS — M25.562 CHRONIC PAIN OF LEFT KNEE: Primary | ICD-10-CM

## 2022-09-28 DIAGNOSIS — G89.29 CHRONIC PAIN OF LEFT KNEE: Primary | ICD-10-CM

## 2022-09-28 PROCEDURE — 97110 THERAPEUTIC EXERCISES: CPT | Mod: GP | Performed by: PHYSICAL THERAPIST

## 2022-10-05 ENCOUNTER — THERAPY VISIT (OUTPATIENT)
Dept: PHYSICAL THERAPY | Facility: CLINIC | Age: 42
End: 2022-10-05
Attending: FAMILY MEDICINE
Payer: COMMERCIAL

## 2022-10-05 ENCOUNTER — TELEPHONE (OUTPATIENT)
Dept: ORTHOPEDICS | Facility: CLINIC | Age: 42
End: 2022-10-05

## 2022-10-05 DIAGNOSIS — G89.29 CHRONIC PAIN OF LEFT KNEE: ICD-10-CM

## 2022-10-05 DIAGNOSIS — G89.29 CHRONIC PAIN OF LEFT KNEE: Primary | ICD-10-CM

## 2022-10-05 DIAGNOSIS — M25.562 CHRONIC PAIN OF LEFT KNEE: Primary | ICD-10-CM

## 2022-10-05 DIAGNOSIS — M25.562 CHRONIC PAIN OF LEFT KNEE: ICD-10-CM

## 2022-10-05 DIAGNOSIS — M17.12 OSTEOARTHRITIS OF LEFT KNEE, UNSPECIFIED OSTEOARTHRITIS TYPE: Primary | ICD-10-CM

## 2022-10-05 PROCEDURE — 97110 THERAPEUTIC EXERCISES: CPT | Mod: GP | Performed by: PHYSICAL THERAPIST

## 2022-10-05 ASSESSMENT — ACTIVITIES OF DAILY LIVING (ADL)
WALK: ACTIVITY IS NOT DIFFICULT
LIMPING: I HAVE THE SYMPTOM BUT IT DOES NOT AFFECT MY ACTIVITY
KNEE_ACTIVITY_OF_DAILY_LIVING_SCORE: 72.31
WEAKNESS: I HAVE THE SYMPTOM BUT IT DOES NOT AFFECT MY ACTIVITY
SWELLING: I HAVE THE SYMPTOM BUT IT DOES NOT AFFECT MY ACTIVITY
SQUAT: ACTIVITY IS VERY DIFFICULT
GO DOWN STAIRS: ACTIVITY IS NOT DIFFICULT
STIFFNESS: I HAVE THE SYMPTOM BUT IT DOES NOT AFFECT MY ACTIVITY
GO UP STAIRS: ACTIVITY IS NOT DIFFICULT
STAND: NOT ANSWERED
HOW_WOULD_YOU_RATE_THE_OVERALL_FUNCTION_OF_YOUR_KNEE_DURING_YOUR_USUAL_DAILY_ACTIVITIES?: NEARLY NORMAL
GIVING WAY, BUCKLING OR SHIFTING OF KNEE: I HAVE THE SYMPTOM BUT IT DOES NOT AFFECT MY ACTIVITY
HOW_WOULD_YOU_RATE_THE_CURRENT_FUNCTION_OF_YOUR_KNEE_DURING_YOUR_USUAL_DAILY_ACTIVITIES_ON_A_SCALE_FROM_0_TO_100_WITH_100_BEING_YOUR_LEVEL_OF_KNEE_FUNCTION_PRIOR_TO_YOUR_INJURY_AND_0_BEING_THE_INABILITY_TO_PERFORM_ANY_OF_YOUR_USUAL_DAILY_ACTIVITIES?: 75
RAW_SCORE: 50.62
SIT WITH YOUR KNEE BENT: ACTIVITY IS MINIMALLY DIFFICULT
KNEEL ON THE FRONT OF YOUR KNEE: I AM UNABLE TO DO THE ACTIVITY
AS_A_RESULT_OF_YOUR_KNEE_INJURY,_HOW_WOULD_YOU_RATE_YOUR_CURRENT_LEVEL_OF_DAILY_ACTIVITY?: NEARLY NORMAL
PAIN: I HAVE THE SYMPTOM BUT IT DOES NOT AFFECT MY ACTIVITY
RISE FROM A CHAIR: ACTIVITY IS SOMEWHAT DIFFICULT
KNEE_ACTIVITY_OF_DAILY_LIVING_SUM: 47

## 2022-10-05 NOTE — TELEPHONE ENCOUNTER
.Patient scheduled for appointment on 11/2/22 for discussion of viscosupplementation injection vs steroid injection of left knee.      Steroid  injection last completed 7/27/22.       Prior authorization referral for SynviscOne injection pended.    Patient has completed 4 week trial of physical therapy with only mild improvement.     Please advise.    Steven Tai ATC

## 2022-11-30 ENCOUNTER — OFFICE VISIT (OUTPATIENT)
Dept: ORTHOPEDICS | Facility: CLINIC | Age: 42
End: 2022-11-30
Payer: COMMERCIAL

## 2022-11-30 VITALS — SYSTOLIC BLOOD PRESSURE: 154 MMHG | HEART RATE: 97 BPM | DIASTOLIC BLOOD PRESSURE: 98 MMHG

## 2022-11-30 DIAGNOSIS — M25.562 CHRONIC PAIN OF LEFT KNEE: ICD-10-CM

## 2022-11-30 DIAGNOSIS — G89.29 CHRONIC PAIN OF LEFT KNEE: ICD-10-CM

## 2022-11-30 DIAGNOSIS — M17.12 OSTEOARTHRITIS OF LEFT KNEE, UNSPECIFIED OSTEOARTHRITIS TYPE: Primary | ICD-10-CM

## 2022-11-30 PROCEDURE — 20611 DRAIN/INJ JOINT/BURSA W/US: CPT | Mod: LT | Performed by: FAMILY MEDICINE

## 2022-11-30 RX ORDER — ROPIVACAINE HYDROCHLORIDE 5 MG/ML
3 INJECTION, SOLUTION EPIDURAL; INFILTRATION; PERINEURAL
Status: SHIPPED | OUTPATIENT
Start: 2022-11-30

## 2022-11-30 RX ORDER — TRIAMCINOLONE ACETONIDE 40 MG/ML
40 INJECTION, SUSPENSION INTRA-ARTICULAR; INTRAMUSCULAR
Status: SHIPPED | OUTPATIENT
Start: 2022-11-30

## 2022-11-30 RX ADMIN — ROPIVACAINE HYDROCHLORIDE 3 ML: 5 INJECTION, SOLUTION EPIDURAL; INFILTRATION; PERINEURAL at 10:03

## 2022-11-30 RX ADMIN — TRIAMCINOLONE ACETONIDE 40 MG: 40 INJECTION, SUSPENSION INTRA-ARTICULAR; INTRAMUSCULAR at 10:03

## 2022-11-30 ASSESSMENT — PAIN SCALES - GENERAL: PAINLEVEL: MILD PAIN (3)

## 2022-11-30 NOTE — LETTER
"    2022         RE: Jose Ford  [id number] [address] [facility] [address] [address]        Dear Colleague,    Thank you for referring your patient, Jose Ford, to the [facility]. Please see a copy of my visit note below.    Large Joint Injection/Arthocentesis: L knee joint    Date/Time: 2022 10:03 AM  Performed by: Brendan Allen DO  Authorized by: Brendan Allen DO     Indications:  Pain  Needle Size:  21 G  Guidance: ultrasound    Location:  Knee      Medications:  3 mL ropivacaine 5 MG/ML; 40 mg triamcinolone 40 MG/ML; 48 mg hylan 48 MG/6ML  Outcome:  Tolerated well, no immediate complications  Procedure discussed: discussed risks, benefits, and alternatives    Consent Given by:  Patient  Timeout: timeout called immediately prior to procedure    Prep: patient was prepped and draped in usual sterile fashion     Ultrasound images of procedure were permanently stored.           Jose Ford  :  1980  DOS: 22  MRN: [id number]    Sports Medicine Clinic Visit    PCP: No primary care provider on file.    Jose Ford is a 42 year old male who is seen as a self referral presenting with left knee pain.    Injury: Surgery at age 18. Notes 3 weeks ago, anteroinferior knee and medial knee pain. Popping incident while scratching the back of his left knee with his right toe. Able to put partial weight on his knee with the assistance of crutches.  Additional Features:  Positive: popping, grinding, swelling and fells like it is popping \"in and out of socket\".  Symptoms are better with Nothing.  Symptoms are worse with: ambulating and weightbearing.  Other evaluation and/or treatments so far consists of: aleve, SalonPas, water pill, IcyHot, bracing and crutches.  Recent imaging completed: X-rays completed 2022.  Prior History of related problems: Flare-ups since surgery at age 18 but nothing this prolonged or painful.    Social History: Works " as a cook/     Interim History - August 30, 2022  Since last visit on 7/27/22 patient has moderate left knee pain with continued periodic instability.  Left knee steroid injection completed on 7/27/22 provided pain relief, but notes continued weakness.  Would like to discuss SynviscOne denial.  No new injury in the interim.    Interim History - Novemeber 30, 2022    No new injury. Had 4 Physical Therapy visits, continuing to work on exercises. Has had a couple to episodes stepping wrong, twisting knee or slipping on wet floor. Pain 3/10. Would like injection today.    Review of Systems  Musculoskeletal: as above  Remainder of review of systems is negative including constitutional, CV, pulmonary, GI, Skin and Neurologic except as noted in HPI or medical history.    No past medical history on file.  No past surgical history on file.  No family history on file.    Objective  BP (!) 154/98   Pulse 97       General: healthy, alert and in no distress      HEENT: no scleral icterus or conjunctival erythema     Skin: no suspicious lesions or rash. No jaundice.     CV: regular rhythm by palpation, 2+ distal pulses, no pedal edema      Resp: normal respiratory effort without conversational dyspnea     Psych: normal mood and affect      Gait: antalgic, appropriate coordination and balance, using crutches     Neuro: normal light touch sensory exam of the extremities. Motor strength as noted below     Radiology  XR report from [facility] 7/12/2022:  Left knee x-rays (3V: WB AP, sunrise and lateral) were ordered and interpreted in the office today.  Findings: No evidence of fracture or dislocation. Valgus knee joint alignment with evidence of moderate degenerative changes. Moderate evidence of narrowing and osteophytes, laterally.  Visualization of two metal interference screws s/p ACL reconstruction. steep tunnel positioning with remote tunnel widening. No hardware complications. Moderate lateral joint degenerative  joint changes. No loose bodies. No evidence of fracture or dislocation.     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 Osteoarthritis of left knee, unspecified osteoarthritis type    2. Chronic pain of left knee        Plan:  Discussed the assessment with the patient.  Follow up: prn based on clinical progress  US guided SynviscOne injection today  PT goals reviewed, has had 4 visits, continue HEP, reviewed activity strategies  Prior imaging reviewed including recent x-rays from [facility] demonstrating significant osteoarthritis in the patellofemoral and lateral compartments  XR images independently visualized and reviewed with patient today in clinic  Expectations and limitations of synvisc were reviewed in detail  Often 4-6 weeks before full effect may be noticed  Usually covered up to every 6 months by insurance, but does not need to be repeated unless pain returns, at which point we would re-evaluate  Potential use of CSI in future for flares of pain reviewed in detail  Encouraged modified progressive pain-free activity as tolerated  HEP and Supportive care reviewed  All questions were answered today  Contact us with additional questions or concerns  Signs and sx of concern reviewed      Brendan Allen DO, CAQ  Sports Medicine Physician  [facility]              Disclaimer: This note consists of symbols derived from keyboarding, dictation and/or voice recognition software. As a result, there may be errors in the script that have gone undetected. Please consider this when interpreting information found in this chart.      Again, thank you for allowing me to participate in the care of your patient.        Sincerely,        Brendan Allen DO

## 2022-11-30 NOTE — PROGRESS NOTES
"Large Joint Injection/Arthocentesis: L knee joint    Date/Time: 2022 10:03 AM  Performed by: Brendan Allen DO  Authorized by: Brendan Allen DO     Indications:  Pain  Needle Size:  21 G  Guidance: ultrasound    Location:  Knee      Medications:  3 mL ropivacaine 5 MG/ML; 40 mg triamcinolone 40 MG/ML; 48 mg hylan 48 MG/6ML  Outcome:  Tolerated well, no immediate complications  Procedure discussed: discussed risks, benefits, and alternatives    Consent Given by:  Patient  Timeout: timeout called immediately prior to procedure    Prep: patient was prepped and draped in usual sterile fashion     Ultrasound images of procedure were permanently stored.           Jose Ford  :  1980  DOS: 22  MRN: [id number]    Sports Medicine Clinic Visit    PCP: No primary care provider on file.    Jose Ford is a 42 year old male who is seen as a self referral presenting with left knee pain.    Injury: Surgery at age 18. Notes 3 weeks ago, anteroinferior knee and medial knee pain. Popping incident while scratching the back of his left knee with his right toe. Able to put partial weight on his knee with the assistance of crutches.  Additional Features:  Positive: popping, grinding, swelling and fells like it is popping \"in and out of socket\".  Symptoms are better with Nothing.  Symptoms are worse with: ambulating and weightbearing.  Other evaluation and/or treatments so far consists of: aleve, SalonPas, water pill, IcyHot, bracing and crutches.  Recent imaging completed: X-rays completed 2022.  Prior History of related problems: Flare-ups since surgery at age 18 but nothing this prolonged or painful.    Social History: Works as a cook/     Interim History - 2022  Since last visit on 22 patient has moderate left knee pain with continued periodic instability.  Left knee steroid injection completed on 22 provided pain relief, but notes continued " weakness.  Would like to discuss SynviscOne denial.  No new injury in the interim.    Interim History - Novemeber 30, 2022    No new injury. Had 4 Physical Therapy visits, continuing to work on exercises. Has had a couple to episodes stepping wrong, twisting knee or slipping on wet floor. Pain 3/10. Would like injection today.    Review of Systems  Musculoskeletal: as above  Remainder of review of systems is negative including constitutional, CV, pulmonary, GI, Skin and Neurologic except as noted in HPI or medical history.    No past medical history on file.  No past surgical history on file.  No family history on file.    Objective  BP (!) 154/98   Pulse 97       General: healthy, alert and in no distress      HEENT: no scleral icterus or conjunctival erythema     Skin: no suspicious lesions or rash. No jaundice.     CV: regular rhythm by palpation, 2+ distal pulses, no pedal edema      Resp: normal respiratory effort without conversational dyspnea     Psych: normal mood and affect      Gait: antalgic, appropriate coordination and balance, using crutches     Neuro: normal light touch sensory exam of the extremities. Motor strength as noted below     Radiology  XR report from [facility] 7/12/2022:  Left knee x-rays (3V: WB AP, sunrise and lateral) were ordered and interpreted in the office today.  Findings: No evidence of fracture or dislocation. Valgus knee joint alignment with evidence of moderate degenerative changes. Moderate evidence of narrowing and osteophytes, laterally.  Visualization of two metal interference screws s/p ACL reconstruction. steep tunnel positioning with remote tunnel widening. No hardware complications. Moderate lateral joint degenerative joint changes. No loose bodies. No evidence of fracture or dislocation.     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 Osteoarthritis of left knee, unspecified  osteoarthritis type    2. Chronic pain of left knee        Plan:  Discussed the assessment with the patient.  Follow up: prn based on clinical progress  US guided SynviscOne injection today  PT goals reviewed, has had 4 visits, continue HEP, reviewed activity strategies  Prior imaging reviewed including recent x-rays from [facility] demonstrating significant osteoarthritis in the patellofemoral and lateral compartments  XR images independently visualized and reviewed with patient today in clinic  Expectations and limitations of synvisc were reviewed in detail  Often 4-6 weeks before full effect may be noticed  Usually covered up to every 6 months by insurance, but does not need to be repeated unless pain returns, at which point we would re-evaluate  Potential use of CSI in future for flares of pain reviewed in detail  Encouraged modified progressive pain-free activity as tolerated  HEP and Supportive care reviewed  All questions were answered today  Contact us with additional questions or concerns  Signs and sx of concern reviewed      Brendan Allen DO, MARY LOU  Sports Medicine Physician  [facility]              Disclaimer: This note consists of symbols derived from keyboarding, dictation and/or voice recognition software. As a result, there may be errors in the script that have gone undetected. Please consider this when interpreting information found in this chart.